# Patient Record
Sex: FEMALE | Race: WHITE | Employment: FULL TIME | URBAN - METROPOLITAN AREA
[De-identification: names, ages, dates, MRNs, and addresses within clinical notes are randomized per-mention and may not be internally consistent; named-entity substitution may affect disease eponyms.]

---

## 2017-01-06 ENCOUNTER — TELEPHONE (OUTPATIENT)
Dept: FAMILY MEDICINE | Facility: CLINIC | Age: 37
End: 2017-01-06

## 2017-01-06 NOTE — TELEPHONE ENCOUNTER
Attempted to triage this patient. Call was lost. Significant joint pain, wrists, elbows, neck since last night. Rt knee swollen. No real injury but blamed it on kettle bell training. Denies bug bites or travel. One episode of emesis. Left work today, did not take temp. Nausea stated this am.  She said she couldn't get thru to her normal clinic. Agreed to being fatigued upon questioning. Marlene Adams RN

## 2017-01-06 NOTE — TELEPHONE ENCOUNTER
Reason for call:  Patient reporting a symptom    Symptom or request: Pt calling stating she has had an upset stomach and joint pain    Duration (how long have symptoms been present): two days     Have you been treated for this before? No    Additional comments: Pt states she usually goes to [facility]    Phone Number patient can be reached at:  Home number on file [phone] (home)    Best Time:  Any    Can we leave a detailed message on this number:  YES    Call taken on 1/6/2017 at 2:32 PM by Ashly Palmer

## 2019-01-14 ENCOUNTER — HOSPITAL ENCOUNTER (EMERGENCY)
Facility: CLINIC | Age: 39
Discharge: HOME | End: 2019-01-14
Attending: PHYSICIAN ASSISTANT | Admitting: PHYSICIAN ASSISTANT
Payer: COMMERCIAL

## 2019-01-14 ENCOUNTER — APPOINTMENT (OUTPATIENT)
Dept: GENERAL RADIOLOGY | Facility: CLINIC | Age: 39
End: 2019-01-14
Payer: COMMERCIAL

## 2019-01-14 VITALS
SYSTOLIC BLOOD PRESSURE: 146 MMHG | DIASTOLIC BLOOD PRESSURE: 88 MMHG | OXYGEN SATURATION: 95 % | HEART RATE: 95 BPM | WEIGHT: 137 LBS | RESPIRATION RATE: 18 BRPM | BODY MASS INDEX: 22.82 KG/M2 | TEMPERATURE: 98.2 F | HEIGHT: 65 IN

## 2019-01-14 DIAGNOSIS — J20.9 ACUTE BRONCHITIS, UNSPECIFIED ORGANISM: ICD-10-CM

## 2019-01-14 DIAGNOSIS — R07.0 THROAT PAIN: ICD-10-CM

## 2019-01-14 LAB
INTERNAL QC OK POCT: YES
S PYO AG THROAT QL IA.RAPID: NEGATIVE

## 2019-01-14 PROCEDURE — 71046 X-RAY EXAM CHEST 2 VIEWS: CPT

## 2019-01-14 PROCEDURE — 87081 CULTURE SCREEN ONLY: CPT | Performed by: PHYSICIAN ASSISTANT

## 2019-01-14 PROCEDURE — 87880 STREP A ASSAY W/OPTIC: CPT | Performed by: PHYSICIAN ASSISTANT

## 2019-01-14 PROCEDURE — 99214 OFFICE O/P EST MOD 30 MIN: CPT | Mod: Z6 | Performed by: PHYSICIAN ASSISTANT

## 2019-01-14 PROCEDURE — G0463 HOSPITAL OUTPT CLINIC VISIT: HCPCS | Mod: 25 | Performed by: PHYSICIAN ASSISTANT

## 2019-01-14 RX ORDER — ALBUTEROL SULFATE 90 UG/1
1-2 AEROSOL, METERED RESPIRATORY (INHALATION) EVERY 4 HOURS PRN
Qty: 1 INHALER | Refills: 0 | Status: SHIPPED | OUTPATIENT
Start: 2019-01-14

## 2019-01-14 RX ORDER — PREDNISONE 20 MG/1
TABLET ORAL
Qty: 10 TABLET | Refills: 0 | Status: SHIPPED | OUTPATIENT
Start: 2019-01-14 | End: 2019-01-21

## 2019-01-14 ASSESSMENT — MIFFLIN-ST. JEOR: SCORE: 1284.37

## 2019-01-14 ASSESSMENT — ENCOUNTER SYMPTOMS
FEVER: 1
WHEEZING: 1
VOMITING: 0
SORE THROAT: 1
SHORTNESS OF BREATH: 1
COUGH: 1
NAUSEA: 0
RHINORRHEA: 1
PALPITATIONS: 0
ABDOMINAL PAIN: 0
HEADACHES: 0

## 2019-01-14 NOTE — DISCHARGE INSTRUCTIONS
Use Medication as directed; no antibiotic indicated at this time.     Patient advised to call for any test results (if obtained during visit) within 2-3 days. Throat culture pending.     Hydrate with fluids, rest, cool humidifier.  May use acetaminophen, ibuprofen prn.    For your Cough   The Buckwheat Honey Dose: Given   hour Prior to Bedtime  For children age under 1 year -Do not use due to botulism risk    2-5 years -  tsp (2.5 mL)   6-11 years -1 tsp (5 mL)   12-18 years -2 tsp (10 mL)     Guaifenesin    Adult dose -Not to exceed 2.4 g (2400mg) per day   Child age 6-12 years -100 mg every 4 hr, not to exceed 1.2 g (1200mg) per day    Pediatric, 2-6 years -50 mg every 4 hr as needed, not to exceed 600 mg per day    Cough medications is not recommended in children under 2 years.  With use of cough medications have combination medications be aware of products in the cough medication you are using to avoid overdose    Follow up with PCP in 5-7 days as needed.    Go to Emergency Room if sx worsen or change, Shortness of breath, chest pain, persistent fevers, or painful breathing occur.     Patient voiced understanding of instructions given.

## 2019-01-14 NOTE — ED PROVIDER NOTES
History     Chief Complaint   Patient presents with     URI     HPI    Alicia Perez is a 38 year old female who presents to the clinic today with a chief complaint of cough, and sore throat for 2 day(s).  Patient with fever 1 week ago for 2-3 days and upper respiratory symptoms, but symptoms worsened over the past 2-3 days.   Her cough is described as dry, hacky, slightly productive, spasmodic with blood tinged sputum.    The patient's symptoms are mild and worsening.  Associated symptoms include congestion, nasal congestion, shortness of breath and sore throat. The patient's symptoms are exacerbated by no particular triggers  Patient has been using OTC cough suppressants  to improve symptoms.    Patient smokes 1 ppd for years and states she has history of asthma.       Problem List:    There are no active problems to display for this patient.       Past Medical History:    History reviewed. No pertinent past medical history.    Past Surgical History:    History reviewed. No pertinent surgical history.    Family History:    No family history on file.    Social History:  Marital Status:   [4]  Social History     Tobacco Use     Smoking status: Not on file   Substance Use Topics     Alcohol use: Not on file     Drug use: Not on file        Medications:      albuterol (PROAIR HFA/PROVENTIL HFA/VENTOLIN HFA) 108 (90 Base) MCG/ACT inhaler   predniSONE (DELTASONE) 20 MG tablet   OLANZapine zydis (ZYPREXA ZYDIS) 5 MG disintegrating tablet   PRENATAL OR   venlafaxine (EFFEXOR-XR) 37.5 MG 24 hr capsule         Review of Systems   Constitutional: Positive for fever (first few days. ).   HENT: Positive for congestion, rhinorrhea and sore throat.    Respiratory: Positive for cough, shortness of breath and wheezing.    Cardiovascular: Negative for palpitations.   Gastrointestinal: Negative for abdominal pain, nausea and vomiting.   Skin: Negative for rash.   Neurological: Negative for headaches.   All other systems  "reviewed and are negative.      Physical Exam   BP: 146/88  Pulse: 95  Temp: 98.2  F (36.8  C)  Resp: 18  Height: 163.8 cm (5' 4.5\")  Weight: 62.1 kg (137 lb)  SpO2: 95 %      Physical Exam     /88   Pulse 95   Temp 98.2  F (36.8  C) (Temporal)   Resp 18   Ht 1.638 m (5' 4.5\")   Wt 62.1 kg (137 lb)   SpO2 95%   BMI 23.15 kg/m    GENERAL APPEARANCE: healthy, alert and no distress  EYES: EOMI,  PERRL, conjunctiva clear  HENT: TM's normal bilaterally, rhinorrhea clear and tonsillar erythema  NECK: supple, nontender, no lymphadenopathy  RESP: lungs clear to auscultation - no rales, rhonchi or wheezes. Positive spasmatic cough with deep breaths.   CV: regular rates and rhythm, normal S1 S2, no murmur noted  ABDOMEN:  soft, nontender, no HSM or masses and bowel sounds normal  NEURO: Normal strength and tone, sensory exam grossly normal,  normal speech and mentation  SKIN: no suspicious lesions or rashes      Results for orders placed or performed during the hospital encounter of 01/14/19   Chest XR,  PA & LAT    Narrative    CHEST TWO VIEWS  1/14/2019 3:11 PM     HISTORY: 38-year-old patient with cough and congestion.       Impression    IMPRESSION:  Heart size is normal. No pleural effusion, pneumothorax,  or abnormal area of consolidation.   Rapid strep group A screen POCT   Result Value Ref Range    Rapid Strep A Screen negative neg    Internal QC OK Yes      Offered neb treatment in office today; but patient declined.             ED Course        Procedures              Critical Care time:  none               Results for orders placed or performed during the hospital encounter of 01/14/19 (from the past 24 hour(s))   Rapid strep group A screen POCT   Result Value Ref Range    Rapid Strep A Screen negative neg    Internal QC OK Yes    Chest XR,  PA & LAT    Narrative    CHEST TWO VIEWS  1/14/2019 3:11 PM     HISTORY: 38-year-old patient with cough and congestion.       Impression    IMPRESSION:  Heart size " is normal. No pleural effusion, pneumothorax,  or abnormal area of consolidation.       Medications - No data to display    Assessments & Plan (with Medical Decision Making)     I have reviewed the nursing notes.    I have reviewed the findings, diagnosis, plan and need for follow up with the patient.   38-year-old female presents the urgent care with 1 week history of upper respiratory symptoms worsen over the past 3 days with sputum spasmatic.  Patient is also noticed a sore throat over the past 2-3 days.  Tobacco use end-stage she has a history of asthma, but denies any inhalers currently.  Patient has been using over-the-counter cough medications to improve symptoms.  On exam lungs clear bilaterally throughout with good air movement.  Posterior pharynx erythematous.  Patient does have a spasmatic cough and was offered a neb treatment in office, but declined.  Chest x-ray was obtained due to occasional blood-tinged sputum spoke about.  This was negative.  Strep test was also negative with throat culture currently pending.  Discussed with patient that at this time symptoms appear to be viral based on prednisone and albuterol inhaler.  Patient follow-up with primary care doctor in a couple days if symptoms persist or fail to improve.  Patient return sooner if symptoms worsen or change or fevers occur.       Medication List      Started    albuterol 108 (90 Base) MCG/ACT inhaler  Commonly known as:  PROAIR HFA/PROVENTIL HFA/VENTOLIN HFA  1-2 puffs, Inhalation, EVERY 4 HOURS PRN     predniSONE 20 MG tablet  Commonly known as:  DELTASONE  Take two tablets (= 40mg) each day for 5 (five) days            Final diagnoses:   Acute bronchitis, unspecified organism   Throat pain       1/14/2019   [facility] EMERGENCY DEPARTMENT     Elizabeth Poole PA-C  01/14/19 3102

## 2019-01-14 NOTE — ED AVS SNAPSHOT
[facility] Department  [address] [address]  Phone:  [phone]  Fax:  [phone]                                    Alicia Perez   MRN: [id number]    Department:  [facility] Emergency Department   Date of Visit:  1/14/2019           After Visit Summary Signature Page    I have received my discharge instructions, and my questions have been answered. I have discussed any challenges I see with this plan with the nurse or doctor.    ..........................................................................................................................................  Patient/Patient Representative Signature      ..........................................................................................................................................  Patient Representative Print Name and Relationship to Patient    ..................................................               ................................................  Date                                   Time    ..........................................................................................................................................  Reviewed by Signature/Title    ...................................................              ..............................................  Date                                               Time          22EPIC Rev 08/18

## 2019-01-16 LAB
BACTERIA SPEC CULT: NORMAL
Lab: NORMAL
SPECIMEN SOURCE: NORMAL

## 2019-01-16 NOTE — RESULT ENCOUNTER NOTE
Final Beta strep group A r/o culture is NEGATIVE for Group A streptococcus.    No treatment or change in treatment per [place] Strep protocol.

## 2020-01-20 ENCOUNTER — OFFICE VISIT (OUTPATIENT)
Dept: FAMILY MEDICINE | Facility: CLINIC | Age: 40
End: 2020-01-20
Payer: COMMERCIAL

## 2020-01-20 VITALS
DIASTOLIC BLOOD PRESSURE: 84 MMHG | TEMPERATURE: 96.6 F | SYSTOLIC BLOOD PRESSURE: 132 MMHG | OXYGEN SATURATION: 99 % | RESPIRATION RATE: 10 BRPM | HEIGHT: 65 IN | WEIGHT: 145.4 LBS | HEART RATE: 99 BPM | BODY MASS INDEX: 24.22 KG/M2

## 2020-01-20 DIAGNOSIS — Z71.6 ENCOUNTER FOR TOBACCO USE CESSATION COUNSELING: ICD-10-CM

## 2020-01-20 DIAGNOSIS — M54.50 ACUTE BILATERAL LOW BACK PAIN WITHOUT SCIATICA: Primary | ICD-10-CM

## 2020-01-20 PROCEDURE — 90715 TDAP VACCINE 7 YRS/> IM: CPT | Performed by: FAMILY MEDICINE

## 2020-01-20 PROCEDURE — 90732 PPSV23 VACC 2 YRS+ SUBQ/IM: CPT | Performed by: FAMILY MEDICINE

## 2020-01-20 PROCEDURE — 90471 IMMUNIZATION ADMIN: CPT | Performed by: FAMILY MEDICINE

## 2020-01-20 PROCEDURE — 90472 IMMUNIZATION ADMIN EACH ADD: CPT | Performed by: FAMILY MEDICINE

## 2020-01-20 PROCEDURE — 99214 OFFICE O/P EST MOD 30 MIN: CPT | Mod: 25 | Performed by: FAMILY MEDICINE

## 2020-01-20 RX ORDER — BUPROPION HYDROCHLORIDE 150 MG/1
150 TABLET, FILM COATED, EXTENDED RELEASE ORAL 2 TIMES DAILY
Qty: 60 TABLET | Refills: 11 | Status: SHIPPED | OUTPATIENT
Start: 2020-01-20

## 2020-01-20 RX ORDER — CYCLOBENZAPRINE HCL 10 MG
10 TABLET ORAL 3 TIMES DAILY PRN
Qty: 30 TABLET | Refills: 0 | Status: SHIPPED | OUTPATIENT
Start: 2020-01-20 | End: 2020-01-30

## 2020-01-20 ASSESSMENT — MIFFLIN-ST. JEOR: SCORE: 1321.66

## 2020-01-20 NOTE — PROGRESS NOTES
SUBJECTIVE:                                                    Alicia Perez is 39 year old female   Chief Complaint   Patient presents with     Back Pain         Problem list and histories reviewed & adjusted, as indicated.  Additional history: works as  and travels all over [place] and [place], no known event or injury.  Pain in low back and worse with movement of legs, bending and flexing at hip and waist.  Would like to quit smoking, tried Welbutrin in past, is ready now.    Patient Active Problem List   Diagnosis     Tobacco use disorder     No past surgical history on file.    Social History     Tobacco Use     Smoking status: Current Every Day Smoker     Types: Cigarettes     Smokeless tobacco: Never Used   Substance Use Topics     Alcohol use: Yes     Comment: Moderate      No family history on file.      Current Outpatient Medications   Medication Sig Dispense Refill     albuterol (PROAIR HFA/PROVENTIL HFA/VENTOLIN HFA) 108 (90 Base) MCG/ACT inhaler Inhale 1-2 puffs into the lungs every 4 hours as needed 1 Inhaler 0     buPROPion (ZYBAN) 150 MG 12 hr tablet Take 1 tablet (150 mg) by mouth 2 times daily 60 tablet 11     cyclobenzaprine (FLEXERIL) 10 MG tablet Take 1 tablet (10 mg) by mouth 3 times daily as needed for muscle spasms 30 tablet 0     OLANZapine zydis (ZYPREXA ZYDIS) 5 MG disintegrating tablet Take 1 tablet (5 mg) by mouth every 6 hours as needed (Patient not taking: Reported on 1/20/2020) 10 tablet 0     PRENATAL OR None Entered       venlafaxine (EFFEXOR-XR) 37.5 MG 24 hr capsule Take 1 capsule daily for 7 days, then take 2 capsules daily. (Patient not taking: Reported on 1/20/2020) 46 capsule 0     No Known Allergies  Recent Labs   Lab Test 11/28/14 2055   ALT 23   CR 0.73   GFRESTIMATED >90  Non  GFR Calc     GFRESTBLACK >90   GFR Calc     POTASSIUM 3.8      BP Readings from Last 3 Encounters:   01/20/20 132/84   01/14/19 146/88   07/05/16  "(!) 143/91    Wt Readings from Last 3 Encounters:   01/20/20 66 kg (145 lb 6.4 oz)   01/14/19 62.1 kg (137 lb)   11/28/14 64.4 kg (142 lb)         ROS:  Constitutional, HEENT, cardiovascular, pulmonary, gi and gu systems are negative, except as otherwise noted.    OBJECTIVE:                                                    /84 (BP Location: Right arm, Patient Position: Sitting, Cuff Size: Adult Regular)   Pulse 99   Temp 96.6  F (35.9  C) (Tympanic)   Resp 10   Ht 1.645 m (5' 4.76\")   Wt 66 kg (145 lb 6.4 oz)   LMP 01/01/2020   SpO2 99%   BMI 24.37 kg/m    GENERAL APPEARANCE ADULT: Alert, no acute distress  MS: extremities normal, no peripheral edema  SKIN: no suspicious lesions or rashes  NEURO: Alert, oriented, speech and mentation normal  PSYCH: mentation appears normal., affect and mood normal  Diagnostic Test Results:  none      ASSESSMENT/PLAN:                                                    1. Acute bilateral low back pain without sciatica  Scheduled over the counter pain medication, rest, given note for work.  - cyclobenzaprine (FLEXERIL) 10 MG tablet; Take 1 tablet (10 mg) by mouth 3 times daily as needed for muscle spasms  Dispense: 30 tablet; Refill: 0  - PHYSICAL THERAPY REFERRAL; Future    2. Encounter for tobacco use cessation counseling  - buPROPion (ZYBAN) 150 MG 12 hr tablet; Take 1 tablet (150 mg) by mouth 2 times daily  Dispense: 60 tablet; Refill: 11    Nina Leon MD  [facility]  Chief Complaint   Patient presents with     Back Pain       "

## 2020-01-20 NOTE — LETTER
[facility]  [address] [place] [address]  [phone]    RE:  Alicia Perez  [address] [place] [address] [phone] (home) [phone] (work)  January 20, 2020    TO WHOM IT MAY CONCERN:    Alicia Perez was seen on 1/20/2020.  Please excuse her until better due to injury.    Cordially,      LEMUEL DALY MD.

## 2020-01-20 NOTE — PATIENT INSTRUCTIONS
Scheduled over the counter pain meds.     Ibuprofen 600mg orally every 6 hours scheduled for 10 days and/or   Tylenol 650 mg two orally every 12 hours scheduled for 10 days       Can take additional 1300 mg in a 24 hour time     Taking pain medication on a schedule will help to get ahead of the pain.  You are not waiting for the pain to take the medicine.  An example of scheduled routine for every 6 hours is to take 600 mg of ibuprofen at 6 am, 12 pm, 6 pm, 12 am. If this is not working to manage your pain add Tylenol 1300 mg at 6 am and 6 pm.      If you get good pain relief and it returns when stopped consider using these medications longer.  Risks from long term use of ibuprofen and tylenol  are minimal.  Ibuprofen long term can give you stomach inflammation and stomach ulcers, taking with food helps prevent this.  Consuming tylenol when using alcohol regularily can stress the liver.      Maximum dose of ibuprofen is 3200 mg daily.  Maximum dose of Tylenol is 4000 mg a day.  Those with kidney or liver disease need to use less or none at all.   It is safe to use both Tylenol and ibuprofen together scheduled.  Expect the pain relief effect to be more than additive.

## 2020-07-31 ENCOUNTER — APPOINTMENT (OUTPATIENT)
Dept: GENERAL RADIOLOGY | Facility: CLINIC | Age: 40
End: 2020-07-31
Attending: FAMILY MEDICINE
Payer: COMMERCIAL

## 2020-07-31 ENCOUNTER — HOSPITAL ENCOUNTER (EMERGENCY)
Facility: CLINIC | Age: 40
Discharge: HOME | End: 2020-07-31
Attending: FAMILY MEDICINE | Admitting: FAMILY MEDICINE
Payer: COMMERCIAL

## 2020-07-31 VITALS
WEIGHT: 145 LBS | BODY MASS INDEX: 24.31 KG/M2 | OXYGEN SATURATION: 96 % | DIASTOLIC BLOOD PRESSURE: 85 MMHG | SYSTOLIC BLOOD PRESSURE: 118 MMHG | TEMPERATURE: 98.8 F | RESPIRATION RATE: 16 BRPM | HEART RATE: 71 BPM

## 2020-07-31 DIAGNOSIS — Z20.822 SUSPECTED 2019 NOVEL CORONAVIRUS INFECTION: ICD-10-CM

## 2020-07-31 PROCEDURE — 93010 ELECTROCARDIOGRAM REPORT: CPT | Mod: Z6 | Performed by: FAMILY MEDICINE

## 2020-07-31 PROCEDURE — 99291 CRITICAL CARE FIRST HOUR: CPT | Mod: 25 | Performed by: FAMILY MEDICINE

## 2020-07-31 PROCEDURE — C9803 HOPD COVID-19 SPEC COLLECT: HCPCS | Performed by: FAMILY MEDICINE

## 2020-07-31 PROCEDURE — 71045 X-RAY EXAM CHEST 1 VIEW: CPT

## 2020-07-31 PROCEDURE — 99285 EMERGENCY DEPT VISIT HI MDM: CPT | Mod: 25 | Performed by: FAMILY MEDICINE

## 2020-07-31 PROCEDURE — U0003 INFECTIOUS AGENT DETECTION BY NUCLEIC ACID (DNA OR RNA); SEVERE ACUTE RESPIRATORY SYNDROME CORONAVIRUS 2 (SARS-COV-2) (CORONAVIRUS DISEASE [COVID-19]), AMPLIFIED PROBE TECHNIQUE, MAKING USE OF HIGH THROUGHPUT TECHNOLOGIES AS DESCRIBED BY CMS-2020-01-R: HCPCS | Performed by: FAMILY MEDICINE

## 2020-07-31 PROCEDURE — 93005 ELECTROCARDIOGRAM TRACING: CPT | Performed by: FAMILY MEDICINE

## 2020-07-31 ASSESSMENT — ENCOUNTER SYMPTOMS
SHORTNESS OF BREATH: 1
DIARRHEA: 0
SORE THROAT: 1
FREQUENCY: 0
ABDOMINAL PAIN: 0
FEVER: 0
DYSURIA: 0
NAUSEA: 0
VOMITING: 0
FATIGUE: 1
CHILLS: 0
COUGH: 1
DIAPHORESIS: 0
CONSTIPATION: 0
HEADACHES: 0
PALPITATIONS: 0
BLOOD IN STOOL: 0
SINUS PRESSURE: 0
WHEEZING: 0

## 2020-07-31 NOTE — LETTER
July 31, 2020      To Whom It May Concern:    Alicia Perez has been evaluated and needs to remain out of work to isolate for 10 days from when symptoms started AND 72 hours after fever resolves (without fever reducing medications) AND improvement of respiratory symptoms (whichever is longer).  If local guidelines require longer off work time period, then follow the longer guidelines (e.g. 14 day quarantine),     Sincerely,        Joe Gray MD

## 2020-07-31 NOTE — ED TRIAGE NOTES
Pt here with aches and pains that started yesterday and has gotten worse throughout the day today. Pt states that it is hard to catch a breath and that she has no energy.

## 2020-07-31 NOTE — ED AVS SNAPSHOT
[facility]  [address] [address]  Phone:  [phone]  Fax:  [phone]                                    Alicia Perez   MRN: [id number]    Department:  [facility]   Date of Visit:  7/31/2020           After Visit Summary Signature Page    I have received my discharge instructions, and my questions have been answered. I have discussed any challenges I see with this plan with the nurse or doctor.    ..........................................................................................................................................  Patient/Patient Representative Signature      ..........................................................................................................................................  Patient Representative Print Name and Relationship to Patient    ..................................................               ................................................  Date                                   Time    ..........................................................................................................................................  Reviewed by Signature/Title    ...................................................              ..............................................  Date                                               Time          22EPIC Rev 08/18

## 2020-07-31 NOTE — ED PROVIDER NOTES
History     Chief Complaint   Patient presents with     Fatigue     Shortness of Breath     HPI  Alicia Perez is a 39 year old female who presents with a history of tobacco abuse 1/2 ppd.   She presents today with myalgias and dyspnea on exertion with a sense of no energy.  This occurred over the last 24 hours.  She works at [employer] and also works off-site but she is always masking and has her temperature checked daily and has no obvious COVID exposure.  She has a chronic cough but it is increased in the last 24 hours and productive.  She has no associated fever.  There is no significant congestion.  She has mild pharyngitis.  Sense of anterior chest tightness at times.  Headache.  No other significant systemic symptoms.  Dyspnea on exertion.    History of mild intermittent asthma for which she is an inhaler but has not used it recently.  No current wheezing.      Allergies:  No Known Allergies    Problem List:    Patient Active Problem List    Diagnosis Date Noted     Tobacco use disorder 07/12/2007     Priority: Medium        Past Medical History:    History reviewed. No pertinent past medical history.    Past Surgical History:    History reviewed. No pertinent surgical history.    Family History:    History reviewed. No pertinent family history.    Social History:  Marital Status:   [4]  Social History     Tobacco Use     Smoking status: Current Every Day Smoker     Types: Cigarettes     Smokeless tobacco: Never Used   Substance Use Topics     Alcohol use: Yes     Comment: Moderate      Drug use: None        Medications:    albuterol (PROAIR HFA/PROVENTIL HFA/VENTOLIN HFA) 108 (90 Base) MCG/ACT inhaler  buPROPion (ZYBAN) 150 MG 12 hr tablet  OLANZapine zydis (ZYPREXA ZYDIS) 5 MG disintegrating tablet  PRENATAL OR  venlafaxine (EFFEXOR-XR) 37.5 MG 24 hr capsule          Review of Systems   Constitutional: Positive for fatigue. Negative for chills, diaphoresis and fever.   HENT: Positive  for sore throat. Negative for ear pain and sinus pressure.    Eyes: Negative for visual disturbance.   Respiratory: Positive for cough and shortness of breath. Negative for wheezing.    Cardiovascular: Negative for chest pain and palpitations.   Gastrointestinal: Negative for abdominal pain, blood in stool, constipation, diarrhea, nausea and vomiting.   Genitourinary: Negative for dysuria, frequency and urgency.   Skin: Negative for rash.   Neurological: Negative for headaches.   All other systems reviewed and are negative.      Physical Exam   BP: 123/86  Heart Rate: 85  Temp: 98.8  F (37.1  C)  Resp: 16  Weight: 65.8 kg (145 lb)  SpO2: 94 %      Physical Exam  Constitutional:       General: She is in acute distress.      Appearance: She is not toxic-appearing or diaphoretic.   HENT:      Mouth/Throat:      Mouth: Mucous membranes are moist.      Pharynx: No pharyngeal swelling or oropharyngeal exudate.   Neck:      Musculoskeletal: Neck supple.   Cardiovascular:      Rate and Rhythm: Normal rate and regular rhythm.   Pulmonary:      Effort: Pulmonary effort is normal.      Breath sounds: Rhonchi (scattered) present. No decreased breath sounds.   Abdominal:      Palpations: Abdomen is soft.   Musculoskeletal:      Right lower leg: No edema.      Left lower leg: No edema.   Neurological:      General: No focal deficit present.      Mental Status: She is alert.         ED Course        Procedures                 EKG Interpretation:      Interpreted by Joe Gray MD  EKG done at 1744 hrs. demonstrates a sinus rhythm at 73 bpm with a normal axis and no ST change.  No T wave change.  Normal R progression and no Q waves.  Normal intervals -with exception of a short HI of 114.  No delta wave.  Normal conduction.  No ectopy.  Impression sinus rhythm 73 bpm no significant change from EKG done 11/29/2014 other than the HI at the time was 122    Critical Care time:  none               Results for orders placed or  performed during the hospital encounter of 07/31/20 (from the past 24 hour(s))   XR Chest Port 1 View    Narrative    CHEST ONE VIEW PORTABLE   7/31/2020 6:02 PM     HISTORY:  . Dyspnea.    COMPARISON: 1/14/2019.      Impression    IMPRESSION: Negative chest. Lungs clear.    CHRISTA VALENCIA MD       Medications - No data to display    Assessments & Plan (with Medical Decision Making)     MDFM: Alicia Perez is a 39 year old female who presents with acute illness onset in the last 24 hours of cough, dyspnea, chest tightness, headache and pharyngitis with her masking at work but does not have the potential for covert exposure.  Also with generalized fatigue here.  Presenting with reassuring vital signs including O2 sat 94%.  Tobacco abuse and mild intermittent asthma but no acute exacerbation based on my exam.  Some scattered rhonchi on exam but no other findings.  Chest x-ray COVID-19 testing done.  Discussed deep breathing exercises position changes.    Additional precautions are given in the AVS and with letter for work.  Reviewed these findings by ED room phone with the patient after the results were back    I have reviewed the nursing notes.    I have reviewed the findings, diagnosis, plan and need for follow up with the patient.       New Prescriptions    No medications on file       Final diagnoses:   Suspected 2019 novel coronavirus infection - change position, deep breathing exercises. consider albuterol use as needed. Get Well Loop.  quarantine       7/31/2020   [facility] EMERGENCY DEPARTMENT     Joe Gray MD  07/31/20 3069

## 2020-07-31 NOTE — DISCHARGE INSTRUCTIONS
"Discharge Instructions for COVID-19 Patients  You have--or may have--COVID-19. Please follow the instructions listed below.   If you have a weakened immune system, discuss with your doctor any other actions you need to take.  How can I protect others?  If you have symptoms (fever, cough, body aches or trouble breathing):  Stay home and away from others (self-isolate) until:  At least 10 days have passed since your symptoms started. And   You've had no fever--and no medicine that reduces fever--for 3 full days (72 hours). And   Your other symptoms have resolved (gotten better).  If you don't show symptoms, but testing showed that you have COVID-19:  Stay home and away from others (self-isolate) until at least 10 days have passed since the date of your first positive COVID-19 test.  During this time  Stay in your own room, even for meals. Use your own bathroom if you can.  Stay away from others in your home. No hugging, kissing or shaking hands. No visitors.  Don't go to work, school or anywhere else.  Clean \"high touch\" surfaces often (doorknobs, counters, handles). Use household cleaning spray or wipes. You'll find a full list of  on the EPA website: www.epa.gov/pesticide-registration/list-n-disinfectants-use-against-sars-cov-2.  Cover your mouth and nose with a mask, tissue or wash cloth to avoid spreading germs.  Wash your hands and face often. Use soap and water.  Caregivers in these groups are at risk for severe illness due to COVID-19:  People 65 years and older  People who live in a nursing home or long-term care facility  People with chronic disease (lung, heart, cancer, diabetes, kidney, liver, immunologic)  People who have a weakened immune system, including those who:  Are in cancer treatment  Take medicine that weakens the immune system, such as corticosteroids  Had a bone marrow or organ transplant  Have an immune deficiency  Have poorly controlled HIV or AIDS  Are obese (body mass index of 40 or " higher)  Smoke regularly  Caregivers should wear gloves while washing dishes, handling laundry and cleaning bedrooms and bathrooms.  Use caution when washing and drying laundry: Don't shake dirty laundry and use the warmest water setting that you can.  For more tips on managing your health at home, go to www.cdc.gov/coronavirus/2019-ncov/downloads/10Things.pdf.  How can I take care of myself at home?  Get lots of rest. Drink extra fluids (unless a doctor has told you not to).  Take Tylenol (acetaminophen) for fever or pain. If you have liver or kidney problems, ask your family doctor if it's okay to take Tylenol.     Adults can take either:  650 mg (two 325 mg pills) every 4 to 6 hours, or   1,000 mg (two 500 mg pills) every 8 hours as needed.  Note: Don't take more than 3,000 mg in one day. Acetaminophen is found in many medicines (both prescribed and over-the-counter medicines). Read all labels to be sure you don't take too much.   For children, check the Tylenol bottle for the right dose. The dose is based on the child's age or weight.  If you have other health problems (like cancer, heart failure, an organ transplant or severe kidney disease): Call your specialty clinic if you don't feel better in the next 2 days.  Know when to call 911. Emergency warning signs include:  Trouble breathing or shortness of breath  Pain or pressure in the chest that doesn't go away  Feeling confused like you haven't felt before, or not being able to wake up  Bluish-colored lips or face  Your doctor may have prescribed a blood thinner medicine. Follow their instructions.  Where can I get more information?  [facility] - About COVID-19:   [email or web address]  CDC - What to Do If You're Sick: www.cdc.gov/coronavirus/2019-ncov/about/steps-when-sick.html  CDC - Ending Home Isolation: www.cdc.gov/coronavirus/2019-ncov/hcp/disposition-in-home-patients.html  CDC - Caring for Someone:  www.cdc.gov/coronavirus/2019-ncov/if-you-are-sick/care-for-someone.html  [facility] - Interim Guidance for Hospital Discharge to Home: www.health.[facility].[place]/diseases/coronavirus/hcp/hospdischarge.pdf  [facility] clinical trials (COVID-19 research studies): clinicalaffairs.[facility]-clinical-trials  Below are the COVID-19 hotlines at the [place] Department of Health ([facility]). Interpreters are available.  For health questions: Call 468-835-8887 or 1-711.892.5160 (7 a.m. to 7 p.m.)  For questions about schools and childcare: Call 869-575-7187 or 1-298.689.2138 (7 a.m. to 7 p.m.)    For informational purposes only. Not to replace the advice of your health care provider. Clinically reviewed by the Infection Prevention Team.Copyright   2020 [facility]. All rights reserved. dscout [id number] - 06/20.

## 2020-08-01 LAB
SARS-COV-2 RNA SPEC QL NAA+PROBE: NOT DETECTED
SPECIMEN SOURCE: NORMAL

## 2020-11-22 ENCOUNTER — HEALTH MAINTENANCE LETTER (OUTPATIENT)
Age: 40
End: 2020-11-22

## 2021-03-04 ENCOUNTER — APPOINTMENT (OUTPATIENT)
Dept: GENERAL RADIOLOGY | Facility: CLINIC | Age: 41
End: 2021-03-04
Attending: NURSE PRACTITIONER
Payer: COMMERCIAL

## 2021-03-04 ENCOUNTER — HOSPITAL ENCOUNTER (EMERGENCY)
Facility: CLINIC | Age: 41
Discharge: HOME | End: 2021-03-04
Attending: NURSE PRACTITIONER | Admitting: NURSE PRACTITIONER
Payer: COMMERCIAL

## 2021-03-04 ENCOUNTER — APPOINTMENT (OUTPATIENT)
Dept: CT IMAGING | Facility: CLINIC | Age: 41
End: 2021-03-04
Attending: NURSE PRACTITIONER
Payer: COMMERCIAL

## 2021-03-04 VITALS
SYSTOLIC BLOOD PRESSURE: 133 MMHG | BODY MASS INDEX: 26.12 KG/M2 | WEIGHT: 153 LBS | HEIGHT: 64 IN | TEMPERATURE: 98.2 F | DIASTOLIC BLOOD PRESSURE: 81 MMHG | HEART RATE: 92 BPM | OXYGEN SATURATION: 98 %

## 2021-03-04 DIAGNOSIS — S92.344A NONDISPLACED FRACTURE OF FOURTH METATARSAL BONE, RIGHT FOOT, INITIAL ENCOUNTER FOR CLOSED FRACTURE: ICD-10-CM

## 2021-03-04 DIAGNOSIS — S92.241A: ICD-10-CM

## 2021-03-04 DIAGNOSIS — S92.224A CLOSED NONDISPLACED FRACTURE OF LATERAL CUNEIFORM OF RIGHT FOOT, INITIAL ENCOUNTER: ICD-10-CM

## 2021-03-04 DIAGNOSIS — S92.321A CLOSED DISPLACED FRACTURE OF SECOND METATARSAL BONE OF RIGHT FOOT, INITIAL ENCOUNTER: ICD-10-CM

## 2021-03-04 PROCEDURE — 99214 OFFICE O/P EST MOD 30 MIN: CPT | Mod: 25 | Performed by: NURSE PRACTITIONER

## 2021-03-04 PROCEDURE — 73700 CT LOWER EXTREMITY W/O DYE: CPT | Mod: RT

## 2021-03-04 PROCEDURE — 28470 CLTX METATARSAL FX WO MNP EA: CPT | Mod: 54 | Performed by: NURSE PRACTITIONER

## 2021-03-04 PROCEDURE — 73630 X-RAY EXAM OF FOOT: CPT | Mod: RT

## 2021-03-04 PROCEDURE — 250N000013 HC RX MED GY IP 250 OP 250 PS 637: Performed by: NURSE PRACTITIONER

## 2021-03-04 PROCEDURE — 28450 TX TARSAL B1 FX W/O MNPJ EA: CPT | Mod: RT | Performed by: NURSE PRACTITIONER

## 2021-03-04 PROCEDURE — 28470 CLTX METATARSAL FX WO MNP EA: CPT | Mod: RT | Performed by: NURSE PRACTITIONER

## 2021-03-04 PROCEDURE — G0463 HOSPITAL OUTPT CLINIC VISIT: HCPCS | Mod: 25 | Performed by: NURSE PRACTITIONER

## 2021-03-04 PROCEDURE — 28450 TX TARSAL B1 FX W/O MNPJ EA: CPT | Mod: 54 | Performed by: NURSE PRACTITIONER

## 2021-03-04 RX ORDER — ACETAMINOPHEN 500 MG
1000 TABLET ORAL ONCE
Status: COMPLETED | OUTPATIENT
Start: 2021-03-04 | End: 2021-03-04

## 2021-03-04 RX ORDER — OXYCODONE HYDROCHLORIDE 5 MG/1
5 TABLET ORAL EVERY 6 HOURS PRN
Qty: 12 TABLET | Refills: 0 | Status: SHIPPED | OUTPATIENT
Start: 2021-03-04 | End: 2021-05-12

## 2021-03-04 RX ORDER — OXYCODONE HYDROCHLORIDE 5 MG/1
5 TABLET ORAL ONCE
Status: DISCONTINUED | OUTPATIENT
Start: 2021-03-04 | End: 2021-03-04 | Stop reason: HOSPADM

## 2021-03-04 RX ORDER — OXYCODONE HYDROCHLORIDE 5 MG/1
5 TABLET ORAL EVERY 6 HOURS PRN
Qty: 12 TABLET | Refills: 0 | Status: SHIPPED | OUTPATIENT
Start: 2021-03-04 | End: 2021-03-04

## 2021-03-04 RX ADMIN — ACETAMINOPHEN 1000 MG: 500 TABLET, FILM COATED ORAL at 16:42

## 2021-03-04 ASSESSMENT — ENCOUNTER SYMPTOMS
SHORTNESS OF BREATH: 0
DYSURIA: 0
WHEEZING: 0
DIFFICULTY URINATING: 0
SPEECH DIFFICULTY: 0
DIAPHORESIS: 0
SORE THROAT: 0
ABDOMINAL PAIN: 0
FEVER: 0
EYE PAIN: 0
CONFUSION: 0
DIARRHEA: 0
COUGH: 0
HEADACHES: 0
VOMITING: 0
MYALGIAS: 0
CHILLS: 0
NAUSEA: 0
CONSTIPATION: 0

## 2021-03-04 ASSESSMENT — MIFFLIN-ST. JEOR: SCORE: 1339

## 2021-03-04 NOTE — ED PROVIDER NOTES
History     Chief Complaint   Patient presents with     Foot Injury     rt foot injury     HPI  Alicia Perez is a 40 year old female who presents with right foot injury occurred last evening around 10 pm at home.  Pt states she jumped over the railing of her deck (ground floor) came down wrong.  Reports pain on top and sole of middle of foot .  PT describes her pain as sharp and tense.  Pt rates pain as 07/10.  Pt unable to bear weight.  Pt has tried elevation, ice, ibuprofen 600 mg twice today.    Allergies:  No Known Allergies    Problem List:    Patient Active Problem List    Diagnosis Date Noted     Tobacco use disorder 07/12/2007     Priority: Medium        Past Medical History:    No past medical history on file.    Past Surgical History:    No past surgical history on file.    Family History:    No family history on file.    Social History:  Marital Status:   [4]  Social History     Tobacco Use     Smoking status: Current Every Day Smoker     Types: Cigarettes     Smokeless tobacco: Never Used   Substance Use Topics     Alcohol use: Yes     Comment: Moderate      Drug use: Not on file        Medications:    oxyCODONE (ROXICODONE) 5 MG tablet  albuterol (PROAIR HFA/PROVENTIL HFA/VENTOLIN HFA) 108 (90 Base) MCG/ACT inhaler  buPROPion (ZYBAN) 150 MG 12 hr tablet  PRENATAL OR          Review of Systems   Constitutional: Negative for chills, diaphoresis and fever.   HENT: Negative for ear pain and sore throat.    Eyes: Negative for pain.   Respiratory: Negative for cough, shortness of breath and wheezing.    Cardiovascular: Negative for chest pain.   Gastrointestinal: Negative for abdominal pain, constipation, diarrhea, nausea and vomiting.   Genitourinary: Negative for difficulty urinating and dysuria.   Musculoskeletal: Negative for myalgias.        Right foot swelling, bruising   Skin: Negative for rash.   Neurological: Negative for speech difficulty and headaches.   Psychiatric/Behavioral:  "Negative for confusion and self-injury.   All other systems reviewed and are negative.      Physical Exam   BP: 133/81  Pulse: 92  Temp: 98.2  F (36.8  C)  Height: 162.6 cm (5' 4\")  Weight: 69.4 kg (153 lb)  SpO2: 98 %      Physical Exam  Vitals signs and nursing note reviewed.   Constitutional:       General: She is not in acute distress.     Appearance: She is well-developed. She is not diaphoretic.   HENT:      Head: Normocephalic and atraumatic.      Right Ear: External ear normal.      Left Ear: External ear normal.   Eyes:      General:         Right eye: No discharge.         Left eye: No discharge.      Conjunctiva/sclera: Conjunctivae normal.   Cardiovascular:      Rate and Rhythm: Normal rate and regular rhythm.      Heart sounds: Normal heart sounds. No murmur. No friction rub.   Pulmonary:      Effort: Pulmonary effort is normal. No respiratory distress.      Breath sounds: Normal breath sounds. No stridor. No wheezing or rales.   Chest:      Chest wall: No tenderness.   Musculoskeletal:      Right foot: Decreased range of motion. Normal capillary refill. Tenderness (generalized), bony tenderness (mid foot tenderness 1-4) and swelling (moderate) present. No crepitus, deformity or laceration.   Skin:     General: Skin is warm and dry.      Coloration: Skin is not pale.      Findings: No erythema or rash.   Neurological:      Mental Status: She is alert.         ED Course        Procedures    Results for orders placed or performed during the hospital encounter of 03/04/21 (from the past 24 hour(s))   Foot  XR, G/E 3 views, right    Narrative    XR FOOT RT G/E 3 VW 3/4/2021 4:42 PM     HISTORY: Difficulty bearing weight following injury.    COMPARISON: None.      Impression    IMPRESSION: Acute nondisplaced fracture involving the medial cuneiform  at the lateral and distal margin including at the origin of the  Lisfranc ligament. No widening of the Lisfranc interval. A fracture in  this region is often " associated with additional midfoot fractures. No  fractures are identified radiographically. CT or MRI could be  performed for further characterization and assessment of additional  possible injuries.    TAYLA ROJAS MD   CT Foot Right w/o Contrast    Narrative    EXAM: CT FOOT RIGHT W/O CONTRAST  LOCATION: [facility]  DATE/TIME: 3/4/2021 5:43 PM    INDICATION: Foot trauma, Lisfranc suspected, xray done (Age >= 6y)  COMPARISON: 3/4/2021 radiographs.  TECHNIQUE: Noncontrast. Axial, sagittal and coronal thin-section reconstruction. Dose reduction techniques were used.   CONTRAST: None.    FINDINGS:   Bone:    First ray: Acute comminuted fracture of the medial cuneiform at the distal and lateral margin is again identified. The fracture involves the entire plantar to dorsal width of the medial cuneiform. There is 0.1 cm of displacement along the articular   surface. The affected fracture region includes the origin of the Lisfranc ligament. Normal alignment of the first metatarsal base and medial cuneiform.    Second ray: There is an acute nondisplaced fracture involving the plantar posterior cortical margin of the base of the second metatarsal and the lateral and dorsal metaphysis of the second metatarsal. Normal alignment of the second metatarsal with the   middle cuneiform.    Third ray: Small nondisplaced avulsion fracture in the distal lateral aspect of the lateral cuneiform. No third metatarsal fracture is evident. Normal alignment of the third metatarsal and lateral cuneiform.    Fourth ray: Oblique nondisplaced fracture of the plantar and medial aspect base of the fourth metatarsal. Normal alignment with the cuboid.    Fifth ray: No fifth metatarsal fracture or cuboid fractures are evident.    Soft tissues: No subcutaneous fluid collection or hematoma.    Muscle/tendons: No retracted tendon tear. No intramuscular hematoma. No muscular atrophy.      Impression    IMPRESSION:  1.  Acute comminuted  fracture of the medial cuneiform including the origin of the Lisfranc ligament.  2.  Acute fracture of the plantar base of the second metatarsal and dorsal lateral base of the second metatarsal.  3.  Small avulsion fracture of the distal lateral aspect of the lateral cuneiform.  4.  Small nondisplaced fracture in the plantar base of the fourth metatarsal.  5.  Normal alignment of the tarsometatarsal joints.             Medications   oxyCODONE (ROXICODONE) tablet 5 mg (5 mg Oral Not Given 3/4/21 1646)   acetaminophen (TYLENOL) tablet 1,000 mg (1,000 mg Oral Given 3/4/21 1642)       Assessments & Plan (with Medical Decision Making)  40-year-old female presenting to urgent care with a right foot injury.  Patient reports she jumped over the rail of her deck last night on ground floor and fell on her foot with immediate severe pain.  Patient reports she has been unable to bear weight since then.  Patient states she has tried rest, ice, elevation, ibuprofen without relief.  Patient denies loss of sensation.  On examination patient has moderate swelling appreciated normal capillary refill, bony tenderness midfoot dorsum of metatarsal 1 through 4.  There is no obvious deformities or crepitus.  X-ray ordered to evaluate for fracture versus sprain.  Tylenol and oxycodone ordered for pain management.  Patient declined the oxycodone due to fear of nausea and vomiting and said that she would just prefer to start with Tylenol and ibuprofen.  X-ray obtained and reveals nondisplaced fracture at the origin of the Lisfranc and recommended CT for further their evaluation.  CT obtained and reveals lateral cuneiform fracture second metatarsal fracture fourth metatarsal fracture and medial cuneiform fracture.  Patient placed in cam walker boot and stressed nonweightbearing and crutch walking and surgical orthopedic referral placed.  Note provided for work to be off work until further evaluation.  Patient prescribed oxycodone for severe  pain.  Discussed pain management with Tylenol and ibuprofen as well.  Patient discharged in stable condition     I have reviewed the nursing notes.    I have reviewed the findings, diagnosis, plan and need for follow up with the patient.    Discharge Medication List as of 3/4/2021  7:03 PM      START taking these medications    Details   oxyCODONE (ROXICODONE) 5 MG tablet Take 1 tablet (5 mg) by mouth every 6 hours as needed for severe pain, Disp-12 tablet, R-0, E-Prescribe             Final diagnoses:   Closed nondisplaced fracture of lateral cuneiform of right foot, initial encounter   Closed displaced fracture of second metatarsal bone of right foot, initial encounter   Nondisplaced fracture of fourth metatarsal bone, right foot, initial encounter for closed fracture   Closed fracture of medial cuneiform of right foot       3/4/2021   [facility] EMERGENCY DEPT     Zurdo, Aida Chisholm, DANYEL CNP  03/04/21 1931

## 2021-03-04 NOTE — LETTER
March 4, 2021      To Whom It May Concern:      Alicia Perez was seen in our Emergency Department today, 03/04/21.  She is unable to work until she has followed up with orthopedics which is likely going to be 03/10/2021.    Sincerely,        DANYEL Yung CNP

## 2021-03-05 NOTE — DISCHARGE INSTRUCTIONS
I recommend rest, ice for swelling, elevation above the level of your heart.  You should wear the cam walker boot at all times.  You can remove it to shower.  You absolutely must be nonweightbearing  You may take Tylenol 1000 mg with ibuprofen 600 mg every 6 hours as needed for pain.  When pain is more severe you may add an oxycodone 1 tablet.  This is a narcotic and can cause constipation, sleepiness.  You should not drive or operate heavy equipment or work when taking this medication.  If you take a narcotic I strongly recommend a stool softener such as Colace and take 200 to 400 mg daily to prevent constipation.  Follow-up with orthopedics for evaluation to determine if you need surgery.  No work until you follow-up with orthopedics.

## 2021-03-08 ENCOUNTER — OFFICE VISIT (OUTPATIENT)
Dept: PODIATRY | Facility: CLINIC | Age: 41
End: 2021-03-08

## 2021-03-08 VITALS
WEIGHT: 153 LBS | BODY MASS INDEX: 26.12 KG/M2 | DIASTOLIC BLOOD PRESSURE: 92 MMHG | SYSTOLIC BLOOD PRESSURE: 133 MMHG | HEIGHT: 64 IN

## 2021-03-08 DIAGNOSIS — S92.344A NONDISPLACED FRACTURE OF FOURTH METATARSAL BONE, RIGHT FOOT, INITIAL ENCOUNTER FOR CLOSED FRACTURE: ICD-10-CM

## 2021-03-08 DIAGNOSIS — S92.321A CLOSED DISPLACED FRACTURE OF SECOND METATARSAL BONE OF RIGHT FOOT, INITIAL ENCOUNTER: ICD-10-CM

## 2021-03-08 DIAGNOSIS — S92.244A CLOSED NONDISPLACED FRACTURE OF MEDIAL CUNEIFORM OF RIGHT FOOT, INITIAL ENCOUNTER: ICD-10-CM

## 2021-03-08 DIAGNOSIS — S92.224A CLOSED NONDISPLACED FRACTURE OF LATERAL CUNEIFORM OF RIGHT FOOT, INITIAL ENCOUNTER: ICD-10-CM

## 2021-03-08 PROCEDURE — 99203 OFFICE O/P NEW LOW 30 MIN: CPT | Performed by: PODIATRIST

## 2021-03-08 ASSESSMENT — MIFFLIN-ST. JEOR: SCORE: 1339

## 2021-03-08 NOTE — PROGRESS NOTES
"PATIENT HISTORY:  Alicia Perez is a 40 year old female who presents to clinic with a chief complaint of a painful right foot.  The patient relates the pain is located on the middle aspect on the right foot.  The patient relates injuring the foot on 3/4/2021 while she jumped over the railing of her deck (ground floor) and came down wrong on the right foot.  The patient was seen by the ER with x-rays revealing fracture of the medial cuneiform on the right.  The patient obtained a CT scan revealing multiple nondisplaced fracture of the midtarsal bones.  The patient was offloaded with a splint.  The patient was instructed to follow up in my clinic for further evaluation and treatment options.    Pertinent medical, surgical and family history was reviewed in the chart.    Vitals: BP (!) 133/92   Ht 1.626 m (5' 4\")   Wt 69.4 kg (153 lb)   BMI 26.26 kg/m    BMI= Body mass index is 26.26 kg/m .    LOWER EXTREMITY PHYSICAL EXAM    Dermatologic: Skin is intact to right lower extremities without significant lesions, rash or abrasion.   Noted edema and ecchymosis in the arch of the right foot.     Vascular: DP & PT pulses are intact & regular on the right.   CFT and skin temperature is normal to the right lower extremities.     Neurologic: Lower extremity sensation is intact to light touch.  No evidence of weakness in the right lower extremities.        Musculoskeletal: Patient is ambulatory without assistive device or brace.  No gross ankle deformity noted.  No foot or ankle joint effusion is noted.  Noted pain on palpation of the arch of the right foot.    Diagnostics:     XR FOOT RT G/E 3 VW 3/4/2021 4:42 PM      HISTORY: Difficulty bearing weight following injury.     COMPARISON: None.                                                                      IMPRESSION: Acute nondisplaced fracture involving the medial cuneiform  at the lateral and distal margin including at the origin of the  Lisfranc ligament. No " widening of the Lisfranc interval. A fracture in  this region is often associated with additional midfoot fractures. No  fractures are identified radiographically. CT or MRI could be  performed for further characterization and assessment of additional  possible injuries.     TAYLA ROJAS MD    CT FOOT RIGHT W/O CONTRAST  LOCATION: [facility]  DATE/TIME: 3/4/2021 5:43 PM     INDICATION: Foot trauma, Lisfranc suspected, xray done (Age >= 6y)  COMPARISON: 3/4/2021 radiographs.  TECHNIQUE: Noncontrast. Axial, sagittal and coronal thin-section reconstruction. Dose reduction techniques were used.   CONTRAST: None.     FINDINGS:   Bone:     First ray: Acute comminuted fracture of the medial cuneiform at the distal and lateral margin is again identified. The fracture involves the entire plantar to dorsal width of the medial cuneiform. There is 0.1 cm of displacement along the articular   surface. The affected fracture region includes the origin of the Lisfranc ligament. Normal alignment of the first metatarsal base and medial cuneiform.     Second ray: There is an acute nondisplaced fracture involving the plantar posterior cortical margin of the base of the second metatarsal and the lateral and dorsal metaphysis of the second metatarsal. Normal alignment of the second metatarsal with the   middle cuneiform.     Third ray: Small nondisplaced avulsion fracture in the distal lateral aspect of the lateral cuneiform. No third metatarsal fracture is evident. Normal alignment of the third metatarsal and lateral cuneiform.     Fourth ray: Oblique nondisplaced fracture of the plantar and medial aspect base of the fourth metatarsal. Normal alignment with the cuboid.     Fifth ray: No fifth metatarsal fracture or cuboid fractures are evident.     Soft tissues: No subcutaneous fluid collection or hematoma.     Muscle/tendons: No retracted tendon tear. No intramuscular hematoma. No muscular atrophy.                                                                       IMPRESSION:  1.  Acute comminuted fracture of the medial cuneiform including the origin of the Lisfranc ligament.  2.  Acute fracture of the plantar base of the second metatarsal and dorsal lateral base of the second metatarsal.  3.  Small avulsion fracture of the distal lateral aspect of the lateral cuneiform.  4.  Small nondisplaced fracture in the plantar base of the fourth metatarsal.  5.  Normal alignment of the tarsometatarsal joints.      ASSESSMENT / PLAN:     ICD-10-CM    1. Closed nondisplaced fracture of lateral cuneiform of right foot, initial encounter  S92.224A Orthopedic & Spine  Referral   2. Closed displaced fracture of second metatarsal bone of right foot, initial encounter  S92.321A Orthopedic & Spine  Referral   3. Nondisplaced fracture of fourth metatarsal bone, right foot, initial encounter for closed fracture  S92.344A Orthopedic & Spine  Referral   4. Closed fracture of medial cuneiform of right foot  S92.241A Orthopedic & Spine  Referral       I have explained to Alicia about the conditions.  We discussed the underlying contributing factors of the condition as well as the treatment plan and expected length of recovery.  At this time, patient will remain nonweightbearing on the right foot with use of a knee scooter.  The patient will return in 1 month for reevaluation and repeat x-rays.    Alicia verbalized agreement with and understanding of the rational for the diagnosis and treatment plan.  All questions were answered to best of my ability and the patient's satisfaction. The patient was advised to contact the clinic with any questions that may arise after the clinic visit.      Disclaimer: This note consists of symbols derived from keyboarding, dictation and/or voice recognition software. As a result, there may be errors in the script that have gone undetected. Please consider this when interpreting  information found in this chart.       SHAHRZAD Moncada D.P.M., [email or web address].

## 2021-03-08 NOTE — PATIENT INSTRUCTIONS
TOE & METATARSAL FRACTURES  The structure of the foot is complex, consisting of bones, muscles, tendons, and other soft tissues. Of the 26 bones in the foot, 19 are toe bones (phalanges) and metatarsal bones (the long bones in the midfoot). Fractures of the toe and metatarsal bones are common and require evaluation by a specialist. A foot and ankle surgeon should be seen for proper diagnosis and treatment, even if initial treatment has been received in an emergency room.  A fracture is a break in the bone. Fractures can be divided into two categories: traumatic fractures and stress fractures.  TRAUMATIC FRACTURES (also called acute fractures) are caused by a direct blow or impact, such as seriously stubbing your toe. Traumatic fractures can be displaced or non-displaced. If the fracture is displaced, the bone is broken in such a way that it has changed in position (dislocated).  Signs and symptoms of a traumatic fracture include:  You may hear a sound at the time of the break.    Pinpoint pain  (pain at the place of impact) at the time the fracture occurs and perhaps for a few hours later, but often the pain goes away after several hours.   Crooked or abnormal appearance of the toe.   Bruising and swelling the next day.   It is not true that  if you can walk on it, it s not broken.  Evaluation by a foot and ankle surgeon is always recommended.   STRESS FRACTURES are tiny, hairline breaks that are usually caused by repetitive stress. Stress fractures often afflict athletes who, for example, too rapidly increase their running mileage. They can also be caused by an abnormal foot structure, deformities, or osteoporosis. Improper footwear may also lead to stress fractures. Stress fractures should not be ignored. They require proper medical attention to heal correctly.  Symptoms of stress fractures include:  Pain with or after normal activity   Pain that goes away when resting and then returns when standing or during  activity    Pinpoint pain  (pain at the site of the fracture) when touched   Swelling, but no bruising   IMPROPER TREATMENT  Some people say that  the doctor can t do anything for a broken bone in the foot.  This is usually not true. In fact, if a fractured toe or metatarsal bone is not treated correctly, serious complications may develop. For example:  A deformity in the bony architecture which may limit the ability to move the foot or cause difficulty in fitting shoes   Arthritis, which may be caused by a fracture in a joint (the juncture where two bones meet), or may be a result of angular deformities that develop when a displaced fracture is severe or hasn t been properly corrected   Chronic pain and deformity   Non-union, or failure to heal, can lead to subsequent surgery or chronic pain.   PROPER TREATMENT FOR TOES  Fractures of the toe bones are almost always traumatic fractures. Treatment for traumatic fractures depends on the break itself and may include these options:  Rest. Sometimes rest is all that is needed to treat a traumatic fracture of the toe.   Splinting. The toe may be fitted with a splint to keep it in a fixed position.   Rigid or stiff-soled shoe. Wearing a stiff-soled shoe protects the toe and helps keep it properly positioned.    Luis taping  the fractured toe to another toe is sometimes appropriate, but in other cases it may be harmful.   Surgery. If the break is badly displaced or if the joint is affected, surgery may be necessary. Surgery often involves the use of fixation devices, such as pins.   PROPER TREATMENT OF METATARSALS  Breaks in the metatarsal bones may be either stress or traumatic fractures. Certain kinds of fractures of the metatarsal bones present unique challenges.  For example, sometimes a fracture of the first metatarsal bone (behind the big toe) can lead to arthritis. Since the big toe is used so frequently and bears more weight than other toes, arthritis in that area  can make it painful to walk, bend, or even stand.  Another type of break, called a Magana fracture, occurs at the base of the fifth metatarsal bone (behind the little toe). It is often misdiagnosed as an ankle sprain, and misdiagnosis can have serious consequences since sprains and fractures require different treatments. Your foot and ankle surgeon is an expert in correctly identifying these conditions as well as other problems of the foot.  Treatment of metatarsal fractures depends on the type and extent of the fracture, and may include:  Rest. Sometimes rest is the only treatment needed to promote healing of a stress or traumatic fracture of a metatarsal bone.   Avoid the offending activity. Because stress fractures result from repetitive stress, it is important to avoid the activity that led to the fracture. Crutches or a wheelchair are sometimes required to offload weight from the foot to give it time to heal.   Immobilization, casting, or rigid shoe. A stiff-soled shoe or other form of immobilization may be used to protect the fractured bone while it is healing.   Surgery. Some traumatic fractures of the metatarsal bones require surgery, especially if the break is badly displaced.   Follow-up care. Your foot and ankle surgeon will provide instructions for care following surgical or non-surgical treatment. Physical therapy, exercises and rehabilitation may be included in a schedule for return to normal activities.          1. Keep the foot elevated above your heart level.  a. When sitting in a chair, rest your foot on a stool or another chair.  b. When lying in bed on on the couch, rest your foot on a couple of pillows.  2. Ice the foot or behind the knee 20 min per hour.  a. This will help reduce the acute inflammation that take place naturally after an injury or surgery.  3. Blood clot prevention  a. It is important to keep the blood moving through the veins to help prevent stagnant blood and  clotting.  b. Perform range of motion exercises of the ankle joint (if not splinted), knee and hip joints throughout the day (every 2 hours).    c. Do not remain in bed or the couch all day.  Get up and move around.  d. Report to the nearest ER or Urgent Care if you develop any swelling, redness, pain or shortness of breath to the lower extremity.  i. There you can be evaluated for possible deep vein thrombosis (DVT).  ii. This action can help prevent a life threatening condition known as a pulmonary embolism (PE).    Pain management:    1. Keep the foot elevated and cool to help reduce inflammation.  2. Take your pain medication as prescribed  a. After 3 to 5 days from surgery, try switching to a combination of Tylenol and Ibuprofen (Advil) taken as directed by package instructions.  i. This will help reducing the risk of developing addiction to narcotics.  b. Do not take Tylenol with any narcotic pain medication as they typically come with acetaminophen (Tylenol).        Alicia to follow up with Primary Care provider regarding elevated blood pressure.

## 2021-03-09 ENCOUNTER — TELEPHONE (OUTPATIENT)
Dept: PODIATRY | Facility: CLINIC | Age: 41
End: 2021-03-09

## 2021-03-09 NOTE — LETTER
March 9, 2021      Alicia Perez  [address] [address]        To Whom It May Concern:    Alicia Perez was seen in our clinic. She may return to work with the following: limited to light duty - no bending/stooping, no climbing and nonweightbearing to the right foot with use of a knee scooter or crutches on or about 3/15/2021.  The estimated time of recovery will be anywhere from 2 to 3 months.  Further evaluation will be performed in 1 month..      Sincerely,        Aguila Moncada DPM

## 2021-03-09 NOTE — LETTER
March 9, 2021      Alicia Perez  [address] [address]        To Whom It May Concern:    Alicia Perez was seen in our clinic. She may return to work with the following: limited to light duty - no bending/stooping, no climbing and nonweightbearing to the right foot with use of a knee scooter or crutches on or about 3/15/2021.  The estimated time of recovery will be anywhere from 2 to 3 months.  Further evaluation will be performed in 1 month..      Sincerely,        Aguila Moncada DPM

## 2021-03-09 NOTE — TELEPHONE ENCOUNTER
Reason for Call:  Other     Detailed comments: Pt was seen 03/08 for broken foot. Pt is requesting a letter for her employer stating when she can return to work and what restrictions. Also has questions about whether or not she can drive - Please advise    Letter can be mailed to patient's home addres    Phone Number Patient can be reached at: Home number on file [phone] (home)    Best Time: Any    Can we leave a detailed message on this number? YES    Call taken on 3/9/2021 at 10:44 AM by Denise Behrendt

## 2021-03-09 NOTE — TELEPHONE ENCOUNTER
Spoke to patient discussed letter provided and work not recommend driving at this time.  Patient requests that letter be sent to her via [employer].  Letter sent to patient.    Leon Razo, ATC

## 2021-03-11 ENCOUNTER — TELEPHONE (OUTPATIENT)
Dept: PODIATRY | Facility: CLINIC | Age: 41
End: 2021-03-11

## 2021-03-15 NOTE — TELEPHONE ENCOUNTER
[facility] Call Center    Phone Message    May a detailed message be left on voicemail: yes     Reason for Call: Other: Patient mentioned she got a letter for work however she want a new one saying that she is unable to drive. Her job contains driving.     Action Taken: Message routed to:  [facility] ([facility] Podiatry    Travel Screening: Not Applicable

## 2021-03-22 ENCOUNTER — TELEPHONE (OUTPATIENT)
Dept: PODIATRY | Facility: CLINIC | Age: 41
End: 2021-03-22

## 2021-03-22 NOTE — TELEPHONE ENCOUNTER
Reason for Call:  Form, our goal is to have forms completed with 7 days, however, some forms may require a visit or additional information.    Type of letter, form or note:  FMLA     Who is the form from?: Insurance    Where did the form come from: form was faxed in    Phone number of person requesting form: [phone]  Can we leave a detailed message on this number:  NO    Desired completion date of form: ASAP      How will form be returned?:  fax to [phone]    Has the patient signed a consent form for release of information (may be included with form)? YES      Form was started and faxed on 3/15/21 for provider review/ completion at [place].    Mary Shay MA on 3/22/2021 at 12:28 PM

## 2021-04-07 ENCOUNTER — ANCILLARY PROCEDURE (OUTPATIENT)
Dept: GENERAL RADIOLOGY | Facility: CLINIC | Age: 41
End: 2021-04-07
Attending: PODIATRIST

## 2021-04-07 ENCOUNTER — OFFICE VISIT (OUTPATIENT)
Dept: PODIATRY | Facility: CLINIC | Age: 41
End: 2021-04-07

## 2021-04-07 VITALS
BODY MASS INDEX: 26.12 KG/M2 | HEIGHT: 64 IN | WEIGHT: 153 LBS | HEART RATE: 104 BPM | DIASTOLIC BLOOD PRESSURE: 91 MMHG | SYSTOLIC BLOOD PRESSURE: 128 MMHG

## 2021-04-07 DIAGNOSIS — S92.224A: ICD-10-CM

## 2021-04-07 DIAGNOSIS — S92.224D CLOSED NONDISPLACED FRACTURE OF LATERAL CUNEIFORM OF RIGHT FOOT WITH ROUTINE HEALING, SUBSEQUENT ENCOUNTER: Primary | ICD-10-CM

## 2021-04-07 PROCEDURE — 99213 OFFICE O/P EST LOW 20 MIN: CPT | Performed by: PODIATRIST

## 2021-04-07 PROCEDURE — 73630 X-RAY EXAM OF FOOT: CPT | Mod: RT | Performed by: RADIOLOGY

## 2021-04-07 ASSESSMENT — MIFFLIN-ST. JEOR: SCORE: 1339

## 2021-04-07 NOTE — NURSING NOTE
"Chief Complaint   Patient presents with     Fracture     right foot, XR today \"aching in the arch\"       Initial BP (!) 128/91   Pulse 104   Ht 1.626 m (5' 4\")   Wt 69.4 kg (153 lb)   BMI 26.26 kg/m   Estimated body mass index is 26.26 kg/m  as calculated from the following:    Height as of this encounter: 1.626 m (5' 4\").    Weight as of this encounter: 69.4 kg (153 lb).  Medications and allergies reviewed.      Mary JACKMAN MA    "

## 2021-04-07 NOTE — LETTER
"    4/7/2021         RE: Alicia Perez  [address]  [facility] [address]        Dear Colleague,    Thank you for referring your patient, Alicia Perez, to the [facility] [place]. Please see a copy of my visit note below.    Alicia returns to the office for reevaluation of the right foot.  The patient relates following the instructions given at the last visit with noted overall less pain and more improvement in function of the right foot.   The patient relates no other problems.    Vitals: BP (!) 128/91   Pulse 104   Ht 1.626 m (5' 4\")   Wt 69.4 kg (153 lb)   BMI 26.26 kg/m    BMI= Body mass index is 26.26 kg/m .    Lower Extremity Physical Exam:      Neurovascular status remains unchanged.  Muscular exam is within normal limits to major muscle groups.  Integument is intact.      Noted slight pain on palpation over the dorsal arch of the right foot.  No significant erythema or edema noted.    Diagnostics:  Radiograph evaluation including AP, lateral and medial oblique views of the right foot reveals interval healing with increased trabeculation of the cuneiform fractures.  I personally evaluated the images as well as reviewed the images with the patient pointing out the findings.      Assessment:     ICD-10-CM    1. Closed nondisplaced fracture of lateral cuneiform of right foot with routine healing, subsequent encounter  S92.224D XR Foot Right G/E 3 Views       Plan:    I have explained to Alicia about the progress of the conditions.  At this time, the patient will remain nonweightbearing for an additional week.  The patient will then transition to protected weightbearing in a cam boot for up to 4 weeks.  The patient will return in 4 weeks for reevaluation.    Alicia verbalized agreement with and understanding of the rational for the diagnosis and treatment plan.  All questions were answered to best of my ability and the patient's satisfaction. The patient was " advised to contact the clinic with any questions that may arise after the clinic visit.      Disclaimer: This note consists of symbols derived from keyboarding, dictation and/or voice recognition software. As a result, there may be errors in the script that have gone undetected. Please consider this when interpreting information found in this chart.       SHAHRZAD Moncada D.P.M., F.DARIN.F.A.S.        Again, thank you for allowing me to participate in the care of your patient.        Sincerely,        Aguila Moncada DPM

## 2021-04-07 NOTE — PATIENT INSTRUCTIONS
Next Steps:    Remain nonweightbearing for an additional week.  Transition to protected weightbearing with a cam boot for the following 4 weeks.    1. Support:  a. Wear supportive shoes, sandals, boots and/or inserts that have a rigid supportive sole.    i. This will offload the majority of tension forces that travel through your feet every step you take.    1. Skechers Max Cushioning Elite/Premier   b. It is important that you also wear supportive shoe wear in the house to continue providing support to your feet.    c. You may always use a cushioned liner for your shoes if that makes your feet feel better.  2. Stretching  a. Calf stretching is essential to offload the tension forces that travel through your feet every step you take  b. Preferred calf stretch is the Runner's Stretch  i. Place one foot behind the other foot, flat against the ground (it is important to keep the heel on the ground).  The back leg is the one that will be stretched.  1. Start with the knee straight and lean your hips into the wall, counter or whatever you are leaning into - count to ten.  2. Next, bend the knee.  You should feel the stretch lower in the calf muscle - count to ten.  c. Repeat this stretch once an hour to start off with.  When symptoms subside, I recommend performing the stretch 3 times daily to prevent any future problems.                3. Tissue Massage  a. It is important that you physically loosen the inflammation tissue to help your body heal the injured tissue.  b. I recommend soaking your foot in warm water to increase the microcirculation to the soft tissues.  You may add Epson salt to the water if you prefer.  c. You may apply an over-the-counter muscle rub, such as Voltaren gel, and deeply massage the injured tissue.  4. Reduce Inflammation  a. You can ice the injured tissue with an ice pack with a light cloth covering or soaking in ice water 20 minutes to reduce any acute inflammation, typically at the end of  the day.  b. If tolerated, you may take a Non-Steroidal Antiinflammatory medication (NSAID), such as Advil or Aleve, to help reduce the inflammation tissue.  This can help the overall healing of the injured tissue.  i. It is important to take food with any NSAID medication as the most common side effect is stomach irritation.  If you encounter any problems when taking NSAID, it is recommended that you stop taking the medication and notify your provider.    It is important to understand that most problems that develop in the foot and ankle are caused by excessive tension that cause microinjury to the soft tissues and inflammation in the foot and ankle.  By addressing the underlying causes with support and stretching as well as treating the current inflammatory conditions with tissue massage and anti-inflammatory treatments, most foot and ankle musculoskeletal conditions will resolve.  This may take time to heal.  However, if symptoms persist past 4 weeks you should return to the office for reevaluation to determine further treatment options.        SMOKING CESSATION  What's in cigarette smoke? - Cigarette smoke contains over 4,000 chemicals. Nicotine is one of the main ingredients which is an insecticide/herbicide. It is poisonous to our nervous system, increases blood clotting risk, and decreases the body's defenses to fight off infection. Another chemical is Carbon Monoxide is an asphyxiating gas that permanently binds to blood cells and blocks the transport of oxygen. This leads to tissue death and decreases your metabolism. Tar is a chemical that coats your lungs and trachea which impairs new oxygen coming in and carbon dioxide getting out of your body.   How does smoking impact surgery? - Smoking is particularly hazardous with regards to surgery. Surgery puts stress on the body and a smoker's body is already under strain from these chemicals. Putting the two together, especially for an elective surgery, could be  a recipe for disaster. Smoking before and after surgery increases your risk of heart problems, slow wound healing, delayed bone healing, blood clots, wound infection and anesthesia complications.   What are the benefits of quitting? - In 20 minutes your blood pressure will drop back down to normal. In 8 hours the carbon monoxide (a toxic gas) levels in your blood stream will drop by half, and oxygen levels will return to normal. In 48 hours your chance of having a heart attack will have decreased. All nicotine will have left your body. Your sense of taste and smell will return to a normal level. In 72 hours your bronchial tubes will relax, and your energy levels will increase. In 2 weeks your circulation will increase, and it will continue to improve for the next 10 weeks.    Recommendations for elective surgery - Ideally, patients should quit smoking 8 weeks before and at least 2 weeks after elective surgery in order to avoid complications. Simply cutting back on the amount of cigarettes smoked per day does not offer any benefit or decrease the risk of poor wound healing, heart problems, and infection. Smokers should also start taking Vitamin C and B for two weeks before surgery and two weeks after surgery.    Ways to Stop Smokin. Nicotine patches, lozenges, or gum  2. Support Groups  3. Medications (see below)    List of Medications:  1. Varenicline Tartrate (CHANTIX)   2. Bupropion HCL (WELLBUTRIN, ZYBAN) - note: make sure Wellbutrin is for smoking cessation and not other issues   3. Nicotine Patch (NICODERM)   4. Nicotine Inhaler (NICOTROL)   5. Nicotine Gum Nicotine Polacrilex   6. Nicotine Lozenge: Nicotine Polacrilex (COMMIT)   * [facility] offers a smoking support group as well!  Please visit: [email or web address]  If you are interested in these, ask about getting a prescription or talk to your primary care doctor about what may be the best way for you to quit.

## 2021-05-12 ENCOUNTER — ANCILLARY PROCEDURE (OUTPATIENT)
Dept: GENERAL RADIOLOGY | Facility: CLINIC | Age: 41
End: 2021-05-12
Attending: PODIATRIST

## 2021-05-12 ENCOUNTER — OFFICE VISIT (OUTPATIENT)
Dept: PODIATRY | Facility: CLINIC | Age: 41
End: 2021-05-12

## 2021-05-12 VITALS
WEIGHT: 153 LBS | HEIGHT: 64 IN | DIASTOLIC BLOOD PRESSURE: 88 MMHG | HEART RATE: 85 BPM | BODY MASS INDEX: 26.12 KG/M2 | SYSTOLIC BLOOD PRESSURE: 130 MMHG

## 2021-05-12 DIAGNOSIS — S92.224D CLOSED NONDISPLACED FRACTURE OF LATERAL CUNEIFORM OF RIGHT FOOT WITH ROUTINE HEALING, SUBSEQUENT ENCOUNTER: Primary | ICD-10-CM

## 2021-05-12 DIAGNOSIS — S92.244D CLOSED NONDISPLACED FRACTURE OF MEDIAL CUNEIFORM OF RIGHT FOOT WITH ROUTINE HEALING, SUBSEQUENT ENCOUNTER: ICD-10-CM

## 2021-05-12 PROCEDURE — 73630 X-RAY EXAM OF FOOT: CPT | Mod: RT | Performed by: RADIOLOGY

## 2021-05-12 PROCEDURE — 99213 OFFICE O/P EST LOW 20 MIN: CPT | Performed by: PODIATRIST

## 2021-05-12 ASSESSMENT — MIFFLIN-ST. JEOR: SCORE: 1339

## 2021-05-12 NOTE — LETTER
5/12/2021         RE: Alicia Perez  [address]  [facility] [address]        Dear Colleague,    Thank you for referring your patient, Alicia Perez, to the [facility] [place]. Please see a copy of my visit note below.    Alicia returns to the office for reevaluation of the right foot.  The patient relates following the instructions given at the last visit with noted overall less pain and more improvement in function of the right foot.   The patient relates no other problems.    Vitals: There were no vitals taken for this visit.  BMI= There is no height or weight on file to calculate BMI.    Lower Extremity Physical Exam:      Neurovascular status remains unchanged.  Muscular exam is within normal limits to major muscle groups.  Integument is intact.      Noted decreased pain on palpation over the dorsal arch of the right foot.  No significant edema or erythema noted.    Diagnostics:  Radiograph evaluation including three views of the right foot reveals interval healing with increased trabeculation of the medial cuneiform and base of the second metatarsal..  I personally evaluated the images as well as reviewed the images with the patient pointing out the findings.      Assessment:     ICD-10-CM    1. Closed nondisplaced fracture of lateral cuneiform of right foot with routine healing, subsequent encounter  S92.224D XR Foot Right G/E 3 Views       Plan:    I have explained to Alicia about the progress of the conditions.  At this time, the patient was prescribed custom orthotics to continue offloading the tension forces traveling through the arch of the right foot.  This will provide added support going forward.  In the meantime the patient was fitted with a Dynaflex insert to shield the medial arch from excessive tension during ambulation.  The patient may resume normal activities and return to work with some limitations.  The patient may return to the office in 4  weeks for reevaluation if problems persist.    Alicia verbalized agreement with and understanding of the rational for the diagnosis and treatment plan.  All questions were answered to best of my ability and the patient's satisfaction. The patient was advised to contact the clinic with any questions that may arise after the clinic visit.      Disclaimer: This note consists of symbols derived from keyboarding, dictation and/or voice recognition software. As a result, there may be errors in the script that have gone undetected. Please consider this when interpreting information found in this chart.       SHAHRZAD Moncada D.P.M., F.A.C.F.A.S.        Again, thank you for allowing me to participate in the care of your patient.        Sincerely,        Aguila Moncada DPM

## 2021-05-12 NOTE — LETTER
May 12, 2021      Alicia Perez  [address] [facility] [address]  [place] [address]        To Whom It May Concern:    Alicia Perez was seen in our clinic. She may return to work with the following: ability to offload the foot when needed during the day on or about 5/12/21.       Sincerely,        Aguila Moncada DPM

## 2021-05-12 NOTE — PROGRESS NOTES
Alicia returns to the office for reevaluation of the right foot.  The patient relates following the instructions given at the last visit with noted overall less pain and more improvement in function of the right foot.   The patient relates no other problems.    Vitals: There were no vitals taken for this visit.  BMI= There is no height or weight on file to calculate BMI.    Lower Extremity Physical Exam:      Neurovascular status remains unchanged.  Muscular exam is within normal limits to major muscle groups.  Integument is intact.      Noted decreased pain on palpation over the dorsal arch of the right foot.  No significant edema or erythema noted.    Diagnostics:  Radiograph evaluation including three views of the right foot reveals interval healing with increased trabeculation of the medial cuneiform and base of the second metatarsal..  I personally evaluated the images as well as reviewed the images with the patient pointing out the findings.      Assessment:     ICD-10-CM    1. Closed nondisplaced fracture of lateral cuneiform of right foot with routine healing, subsequent encounter  S92.224D XR Foot Right G/E 3 Views       Plan:    I have explained to Alicia about the progress of the conditions.  At this time, the patient was prescribed custom orthotics to continue offloading the tension forces traveling through the arch of the right foot.  This will provide added support going forward.  In the meantime the patient was fitted with a Dynaflex insert to shield the medial arch from excessive tension during ambulation.  The patient may resume normal activities and return to work with some limitations.  The patient may return to the office in 4 weeks for reevaluation if problems persist.    Alicia verbalized agreement with and understanding of the rational for the diagnosis and treatment plan.  All questions were answered to best of my ability and the patient's satisfaction. The patient was advised to contact the  clinic with any questions that may arise after the clinic visit.      Disclaimer: This note consists of symbols derived from keyboarding, dictation and/or voice recognition software. As a result, there may be errors in the script that have gone undetected. Please consider this when interpreting information found in this chart.       SHAHRZAD Moncada D.P.M., FABI.F.A.S.

## 2021-05-12 NOTE — PATIENT INSTRUCTIONS
Next Steps:      1. Support:  a. Wear supportive shoes, sandals, boots and/or inserts that have a rigid supportive sole.    i. This will offload the majority of tension forces that travel through your feet every step you take.    1. SkSkyera Max Cushioning Elite/Premier   2. Skechers Relax Fit D'Lux Walker  3. Superfeet inserts (www.superfeet.com)  b. It is important that you also wear supportive shoe wear in the house to continue providing support to your feet.    c. You may always use a cushioned liner for your shoes if that makes your feet feel better.  2. Stretching  a. Calf stretching is essential to offload the tension forces that travel through your feet every step you take  b. Preferred calf stretch is the Runner's Stretch  i. Place one foot behind the other foot, flat against the ground (it is important to keep the heel on the ground).  The back leg is the one that will be stretched.  1. Start with the knee straight and lean your hips into the wall, counter or whatever you are leaning into - count to ten.  2. Next, bend the knee.  You should feel the stretch lower in the calf muscle - count to ten.  c. Repeat this stretch once an hour to start off with.  When symptoms subside, I recommend performing the stretch 3 times daily to prevent any future problems.                3. Tissue Massage  a. It is important that you physically loosen the inflammation tissue to help your body heal the injured tissue.  b. I recommend soaking your foot in warm water to increase the microcirculation to the soft tissues.  You may add Epson salt to the water if you prefer.  c. You may apply an over-the-counter muscle rub, such as Voltaren gel, and deeply massage the injured tissue.  4. Reduce Inflammation  a. You can ice the injured tissue with an ice pack with a light cloth covering or soaking in ice water 20 minutes to reduce any acute inflammation, typically at the end of the day.  b. If tolerated, you may take a  Non-Steroidal Antiinflammatory medication (NSAID), such as Advil or Aleve, to help reduce the inflammation tissue.  This can help the overall healing of the injured tissue.  i. It is important to take food with any NSAID medication as the most common side effect is stomach irritation.  If you encounter any problems when taking NSAID, it is recommended that you stop taking the medication and notify your provider.    It is important to understand that most problems that develop in the foot and ankle are caused by excessive tension that cause microinjury to the soft tissues and inflammation in the foot and ankle.  By addressing the underlying causes with support and stretching as well as treating the current inflammatory conditions with tissue massage and anti-inflammatory treatments, most foot and ankle musculoskeletal conditions will resolve.  This may take time to heal.  However, if symptoms persist past 4 weeks you should return to the office for reevaluation to determine further treatment options.      SMOKING CESSATION  What's in cigarette smoke? - Cigarette smoke contains over 4,000 chemicals. Nicotine is one of the main ingredients which is an insecticide/herbicide. It is poisonous to our nervous system, increases blood clotting risk, and decreases the body's defenses to fight off infection. Another chemical is Carbon Monoxide is an asphyxiating gas that permanently binds to blood cells and blocks the transport of oxygen. This leads to tissue death and decreases your metabolism. Tar is a chemical that coats your lungs and trachea which impairs new oxygen coming in and carbon dioxide getting out of your body.   How does smoking impact surgery? - Smoking is particularly hazardous with regards to surgery. Surgery puts stress on the body and a smoker's body is already under strain from these chemicals. Putting the two together, especially for an elective surgery, could be a recipe for disaster. Smoking before and  after surgery increases your risk of heart problems, slow wound healing, delayed bone healing, blood clots, wound infection and anesthesia complications.   What are the benefits of quitting? - In 20 minutes your blood pressure will drop back down to normal. In 8 hours the carbon monoxide (a toxic gas) levels in your blood stream will drop by half, and oxygen levels will return to normal. In 48 hours your chance of having a heart attack will have decreased. All nicotine will have left your body. Your sense of taste and smell will return to a normal level. In 72 hours your bronchial tubes will relax, and your energy levels will increase. In 2 weeks your circulation will increase, and it will continue to improve for the next 10 weeks.    Recommendations for elective surgery - Ideally, patients should quit smoking 8 weeks before and at least 2 weeks after elective surgery in order to avoid complications. Simply cutting back on the amount of cigarettes smoked per day does not offer any benefit or decrease the risk of poor wound healing, heart problems, and infection. Smokers should also start taking Vitamin C and B for two weeks before surgery and two weeks after surgery.    Ways to Stop Smokin. Nicotine patches, lozenges, or gum  2. Support Groups  3. Medications (see below)    List of Medications:  1. Varenicline Tartrate (CHANTIX)   2. Bupropion HCL (WELLBUTRIN, ZYBAN) - note: make sure Wellbutrin is for smoking cessation and not other issues   3. Nicotine Patch (NICODERM)   4. Nicotine Inhaler (NICOTROL)   5. Nicotine Gum Nicotine Polacrilex   6. Nicotine Lozenge: Nicotine Polacrilex (COMMIT)   * [place] offers a smoking support group as well!  Please visit: [email or web address]  If you are interested in these, ask about getting a prescription or talk to your primary care doctor about what may be the best way for you to quit.

## 2021-05-12 NOTE — NURSING NOTE
"Chief Complaint   Patient presents with     RECHECK     Closed nondisplaced fracture of lateral cuneiform of right foot with routine healing, XR today       Initial /88   Pulse 85   Ht 1.626 m (5' 4\")   Wt 69.4 kg (153 lb)   BMI 26.26 kg/m   Estimated body mass index is 26.26 kg/m  as calculated from the following:    Height as of this encounter: 1.626 m (5' 4\").    Weight as of this encounter: 69.4 kg (153 lb).  Medications and allergies reviewed.      Mary JACKMAN MA    "

## 2021-06-01 ENCOUNTER — RECORDS - HEALTHEAST (OUTPATIENT)
Dept: ADMINISTRATIVE | Facility: CLINIC | Age: 41
End: 2021-06-01

## 2021-11-15 NOTE — LETTER
January 14, 2019      To Whom It May Concern:      Alicia Perez was seen in our Department today, 01/14/19. Please excuse patient from work today and tomorrow due to illness.     Sincerely,        Elizabeth Poole PA-C         Lab Results   Component Value Date    HGBA1C 6.4 (H) 11/10/2021     Cont statin and acei  Follows with dr dupont - will obtain report  Cont current regimen - per pt's insurance will switch from farxiga to jardiance d/t coverage

## 2021-11-24 NOTE — PROGRESS NOTES
"Alicia returns to the office for reevaluation of the right foot.  The patient relates following the instructions given at the last visit with noted overall less pain and more improvement in function of the right foot.   The patient relates no other problems.    Vitals: BP (!) 128/91   Pulse 104   Ht 1.626 m (5' 4\")   Wt 69.4 kg (153 lb)   BMI 26.26 kg/m    BMI= Body mass index is 26.26 kg/m .    Lower Extremity Physical Exam:      Neurovascular status remains unchanged.  Muscular exam is within normal limits to major muscle groups.  Integument is intact.      Noted slight pain on palpation over the dorsal arch of the right foot.  No significant erythema or edema noted.    Diagnostics:  Radiograph evaluation including AP, lateral and medial oblique views of the right foot reveals interval healing with increased trabeculation of the cuneiform fractures.  I personally evaluated the images as well as reviewed the images with the patient pointing out the findings.      Assessment:     ICD-10-CM    1. Closed nondisplaced fracture of lateral cuneiform of right foot with routine healing, subsequent encounter  S92.224D XR Foot Right G/E 3 Views       Plan:    I have explained to Alicia about the progress of the conditions.  At this time, the patient will remain nonweightbearing for an additional week.  The patient will then transition to protected weightbearing in a cam boot for up to 4 weeks.  The patient will return in 4 weeks for reevaluation.    Alicia verbalized agreement with and understanding of the rational for the diagnosis and treatment plan.  All questions were answered to best of my ability and the patient's satisfaction. The patient was advised to contact the clinic with any questions that may arise after the clinic visit.      Disclaimer: This note consists of symbols derived from keyboarding, dictation and/or voice recognition software. As a result, there may be errors in the script that have gone " I have reviewed the notes, assessments, and/or procedures performed by Rafa Rivera, I concur with her/his documentation of Zainab Cooper.     undetected. Please consider this when interpreting information found in this chart.       SHAHRZAD Moncada D.P.M., FABI.F.KUSHS.

## 2023-07-27 NOTE — LETTER
"    3/8/2021         RE: Alicia Perez  [address]  [address]  [facility] [id number]        Dear Colleague,    Thank you for referring your patient, Alicia Perez, to the [facility] [place]. Please see a copy of my visit note below.    PATIENT HISTORY:  Alicia Perez is a 40 year old female who presents to clinic with a chief complaint of a painful right foot.  The patient relates the pain is located on the middle aspect on the right foot.  The patient relates injuring the foot on 3/4/2021 while she jumped over the railing of her deck (ground floor) and came down wrong on the right foot.  The patient was seen by the ER with x-rays revealing fracture of the medial cuneiform on the right.  The patient obtained a CT scan revealing multiple nondisplaced fracture of the midtarsal bones.  The patient was offloaded with a splint.  The patient was instructed to follow up in my clinic for further evaluation and treatment options.    Pertinent medical, surgical and family history was reviewed in the chart.    Vitals: BP (!) 133/92   Ht 1.626 m (5' 4\")   Wt 69.4 kg (153 lb)   BMI 26.26 kg/m    BMI= Body mass index is 26.26 kg/m .    LOWER EXTREMITY PHYSICAL EXAM    Dermatologic: Skin is intact to right lower extremities without significant lesions, rash or abrasion.   Noted edema and ecchymosis in the arch of the right foot.     Vascular: DP & PT pulses are intact & regular on the right.   CFT and skin temperature is normal to the right lower extremities.     Neurologic: Lower extremity sensation is intact to light touch.  No evidence of weakness in the right lower extremities.        Musculoskeletal: Patient is ambulatory without assistive device or brace.  No gross ankle deformity noted.  No foot or ankle joint effusion is noted.  Noted pain on palpation of the arch of the right foot.    Diagnostics:     XR FOOT RT G/E 3 VW 3/4/2021 4:42 PM      HISTORY: Difficulty bearing weight " following injury.     COMPARISON: None.                                                                      IMPRESSION: Acute nondisplaced fracture involving the medial cuneiform  at the lateral and distal margin including at the origin of the  Lisfranc ligament. No widening of the Lisfranc interval. A fracture in  this region is often associated with additional midfoot fractures. No  fractures are identified radiographically. CT or MRI could be  performed for further characterization and assessment of additional  possible injuries.     TAYLA ROJAS MD    CT FOOT RIGHT W/O CONTRAST  LOCATION: [facility]  DATE/TIME: 3/4/2021 5:43 PM     INDICATION: Foot trauma, Lisfranc suspected, xray done (Age >= 6y)  COMPARISON: 3/4/2021 radiographs.  TECHNIQUE: Noncontrast. Axial, sagittal and coronal thin-section reconstruction. Dose reduction techniques were used.   CONTRAST: None.     FINDINGS:   Bone:     First ray: Acute comminuted fracture of the medial cuneiform at the distal and lateral margin is again identified. The fracture involves the entire plantar to dorsal width of the medial cuneiform. There is 0.1 cm of displacement along the articular   surface. The affected fracture region includes the origin of the Lisfranc ligament. Normal alignment of the first metatarsal base and medial cuneiform.     Second ray: There is an acute nondisplaced fracture involving the plantar posterior cortical margin of the base of the second metatarsal and the lateral and dorsal metaphysis of the second metatarsal. Normal alignment of the second metatarsal with the   middle cuneiform.     Third ray: Small nondisplaced avulsion fracture in the distal lateral aspect of the lateral cuneiform. No third metatarsal fracture is evident. Normal alignment of the third metatarsal and lateral cuneiform.     Fourth ray: Oblique nondisplaced fracture of the plantar and medial aspect base of the fourth metatarsal. Normal alignment  with the cuboid.     Fifth ray: No fifth metatarsal fracture or cuboid fractures are evident.     Soft tissues: No subcutaneous fluid collection or hematoma.     Muscle/tendons: No retracted tendon tear. No intramuscular hematoma. No muscular atrophy.                                                                      IMPRESSION:  1.  Acute comminuted fracture of the medial cuneiform including the origin of the Lisfranc ligament.  2.  Acute fracture of the plantar base of the second metatarsal and dorsal lateral base of the second metatarsal.  3.  Small avulsion fracture of the distal lateral aspect of the lateral cuneiform.  4.  Small nondisplaced fracture in the plantar base of the fourth metatarsal.  5.  Normal alignment of the tarsometatarsal joints.      ASSESSMENT / PLAN:     ICD-10-CM    1. Closed nondisplaced fracture of lateral cuneiform of right foot, initial encounter  S92.224A Orthopedic & Spine  Referral   2. Closed displaced fracture of second metatarsal bone of right foot, initial encounter  S92.321A Orthopedic & Spine  Referral   3. Nondisplaced fracture of fourth metatarsal bone, right foot, initial encounter for closed fracture  S92.344A Orthopedic & Spine  Referral   4. Closed fracture of medial cuneiform of right foot  S92.241A Orthopedic & Spine  Referral       I have explained to Alicia about the conditions.  We discussed the underlying contributing factors of the condition as well as the treatment plan and expected length of recovery.  At this time, patient will remain nonweightbearing on the right foot with use of a knee scooter.  The patient will return in 1 month for reevaluation and repeat x-rays.    Alicia verbalized agreement with and understanding of the rational for the diagnosis and treatment plan.  All questions were answered to best of my ability and the patient's satisfaction. The patient was advised to contact the clinic with any questions that  may arise after the clinic visit.      Disclaimer: This note consists of symbols derived from keyboarding, dictation and/or voice recognition software. As a result, there may be errors in the script that have gone undetected. Please consider this when interpreting information found in this chart.       SHAHRZAD Moncada D.P.M., F.CLINT.C.F.A.S.        Again, thank you for allowing me to participate in the care of your patient.        Sincerely,        Aguila Moncada DPM     Excision Depth: adipose tissue
